# Patient Record
Sex: MALE | Race: OTHER | HISPANIC OR LATINO | URBAN - METROPOLITAN AREA
[De-identification: names, ages, dates, MRNs, and addresses within clinical notes are randomized per-mention and may not be internally consistent; named-entity substitution may affect disease eponyms.]

---

## 2023-05-18 ENCOUNTER — EMERGENCY (EMERGENCY)
Facility: HOSPITAL | Age: 28
LOS: 1 days | Discharge: LEFT BEFORE DISCHARGE | End: 2023-05-18
Attending: EMERGENCY MEDICINE
Payer: SELF-PAY

## 2023-05-18 VITALS
HEIGHT: 75 IN | WEIGHT: 225.09 LBS | HEART RATE: 105 BPM | TEMPERATURE: 98 F | DIASTOLIC BLOOD PRESSURE: 82 MMHG | OXYGEN SATURATION: 100 % | RESPIRATION RATE: 16 BRPM | SYSTOLIC BLOOD PRESSURE: 132 MMHG

## 2023-05-18 PROCEDURE — L9991: CPT

## 2023-05-18 NOTE — ED ADULT TRIAGE NOTE - CHIEF COMPLAINT QUOTE
biba from the street and c/o pain to both feet, was walking barefoot from [place] to [place] because he has no money  and he need his body to be checked because he feels that he has a bone fracture. no blisters on his foot seen  denies suicidal

## 2023-05-18 NOTE — ED ADULT NURSE NOTE - CHIEF COMPLAINT QUOTE
biba from the street and c/o pain to both feet, was walking barefoot from [place] to [place] because he has no money  and he need his body to be checked because he feels that he has a bone fracture. no blisters on his foot seen  denies suicidal

## 2023-05-18 NOTE — ED ADULT NURSE NOTE - OBJECTIVE STATEMENT
Pt reported he walked from [place] to [place], verbalized he wanted to leave ED, stated "I'm okay, I'm going to leave." Denies any pain. Refused full body assessment. Risks of leaving explained to pt, pt verbalized understanding and observed walking out of ED with steady gait.

## 2023-05-18 NOTE — ED ADULT NURSE NOTE - NSFALLUNIVINTERV_ED_ALL_ED
Bed/Stretcher in lowest position, wheels locked, appropriate side rails in place/Call bell, personal items and telephone in reach/Instruct patient to call for assistance before getting out of bed/chair/stretcher/Non-slip footwear applied when patient is off stretcher/[place] to call system/Physically safe environment - no spills, clutter or unnecessary equipment/Purposeful proactive rounding/Room/bathroom lighting operational, light cord in reach